# Patient Record
Sex: FEMALE | Race: WHITE
[De-identification: names, ages, dates, MRNs, and addresses within clinical notes are randomized per-mention and may not be internally consistent; named-entity substitution may affect disease eponyms.]

---

## 2017-11-09 ENCOUNTER — HOSPITAL ENCOUNTER (OUTPATIENT)
Dept: HOSPITAL 84 - D.OPS | Age: 57
Discharge: HOME | End: 2017-11-09
Attending: INTERNAL MEDICINE
Payer: COMMERCIAL

## 2017-11-09 VITALS — BODY MASS INDEX: 30.9 KG/M2

## 2017-11-09 VITALS — DIASTOLIC BLOOD PRESSURE: 76 MMHG | SYSTOLIC BLOOD PRESSURE: 134 MMHG

## 2017-11-09 DIAGNOSIS — K29.70: ICD-10-CM

## 2017-11-09 DIAGNOSIS — I85.00: Primary | ICD-10-CM

## 2017-11-09 DIAGNOSIS — Z01.812: ICD-10-CM

## 2017-11-09 DIAGNOSIS — E03.9: ICD-10-CM

## 2017-11-09 DIAGNOSIS — K21.9: ICD-10-CM

## 2017-11-09 DIAGNOSIS — K44.9: ICD-10-CM

## 2017-11-09 DIAGNOSIS — I10: ICD-10-CM

## 2017-11-09 LAB
ERYTHROCYTE [DISTWIDTH] IN BLOOD BY AUTOMATED COUNT: 13.9 % (ref 11.5–14.5)
HCT VFR BLD CALC: 43.5 % (ref 36–48)
HGB BLD-MCNC: 15 G/DL (ref 12–16)
MCH RBC QN AUTO: 32.8 PG (ref 26–34)
MCHC RBC AUTO-ENTMCNC: 34.5 G/DL (ref 31–37)
MCV RBC: 95 FL (ref 80–100)
PLATELET # BLD: 160 10X3/UL (ref 130–400)
PMV BLD AUTO: 9.3 FL (ref 7.4–10.4)
RBC # BLD AUTO: 4.58 10X6/UL (ref 4–5.4)
WBC # BLD AUTO: 7.1 10X3/UL (ref 4.8–10.8)

## 2017-11-10 NOTE — OP
PATIENT NAME:  LARISSA LOPEZ                                 MEDICAL RECORD: [id number]
:60                                             LOCATION:[facility]          
                                                         ADMISSION DATE:        
SURGEON:  JOHANN WORLEY MD          
 
 
DATE OF OPERATION:  2017
 
PROCEDURE:  EGD with biopsy.
 
REFERRING PHYSICIAN:  Dr. Terrell Suarez.
 
INDICATIONS:  Ms. Lopez is a delightful 57-year-old woman, who presents for
followup of gastric and duodenal ulcers.  She had a profound upper GI bleeding
in 2017 requiring admission to [facility].  She had been drinking quite a bit of
alcohol as well as taking BC powders, aspirin, and been on steroids.  EGD (with
Dr. Mccord) on 2017 showed very mild distal esophagitis, one esophageal
varix at the 2 o'clock position without visible vessel, very small hiatal
hernia, small gastric diverticulum in the fundus, erosive gastritis, multiple
gastric ulcers, duodenal bulbar ulcer, and erosive duodenitis.  She has been on
Protonix 40 mg daily and has been avoiding BC powders, nonsteroidal
anti-inflammatory drugs, and alcohol.  Her CBC today shows a white blood cell
count 7.1, hemoglobin 15, MCV of 95, and platelet count 160,000.  She presents
for followup EGD to document healing of gastric ulcers.
 
PREMEDICATIONS:  Total IV anesthesia (propofol 100 mg).
 
INSTRUMENT:  Olympus video gastroscope.
 
PROCEDURE AND FINDINGS:  After receiving informed consent, Ms. Lopez's posterior
pharynx was anesthetized with Cetacaine spray, placed in left lateral decubitus
position and sedated as per anesthesia.  After achieving adequate level of
sedation, gastroscope was introduced per orally and advanced to the duodenum
without difficulty.  The esophageal mucosa was without strictures or masses. I
did not any see any esophageal varices on this exam.  The Z line was slightly
irregular.  A small sliding type hiatal hernia is present.  Gastric mucosa was
notable for moderate prepyloric and antral erythema and there were few
prepyloric erosions.  There were no antral or gastric ulcers noted.  There were
also streaks of erythema noted in the body of the stomach.  Pylorus was patent
and competent.  Antral biopsies were obtained to rule out Helicobacter pylori. 
There is no evidence of residual duodenal ulcers and the duodenal mucosa
appeared normal through the second portion.  Gastroscope was then withdrawn. 
Ms. Lopez dams tolerated the procedure well, no immediate complications.
 
ASSESSMENT:
1.  Small sliding type hiatal hernia.
2.  Moderate gastritis with a few antral erosions.
3.  Gastric ulcers had healed.
4.  Small gastric diverticulum.
 
RECOMMENDATIONS:
1.  Follow up histopathology.
2.  Continue Protonix 40 mg daily.
3.  Avoid nonsteroidal anti-inflammatory drugs.
4.  Follow up in GI clinic for liver evaluation in an individual with previous
history of esophageal varices.
 
TRANSINT:[id number] Voice Confirmation ID: [id number] DOCUMENT ID: [id number]
 
 
 
OPERATIVE REPORT                               [id number]    LARISSA LOPEZ, JOHANN ROWLEY          
 
 
 
Electronically Signed by JOHANN WORLEY on 11/10/17 at 1735
 
 
 
 
 
 
 
 
 
 
 
 
 
 
 
 
 
 
 
 
 
 
 
 
 
 
 
 
 
 
 
 
 
 
 
 
 
 
 
 
 
CC: TERRELL SUAREZ MD                                             [id number]
DICTATION DATE: 17 1352     :     17 1425      [facility] 
                                                                      [id number] [place] [address]

## 2018-11-08 ENCOUNTER — HOSPITAL ENCOUNTER (OUTPATIENT)
Dept: HOSPITAL 84 - D.OPS | Age: 58
Discharge: HOME | End: 2018-11-08
Attending: INTERNAL MEDICINE
Payer: COMMERCIAL

## 2018-11-08 VITALS — BODY MASS INDEX: 28.23 KG/M2 | HEIGHT: 64 IN | WEIGHT: 165.35 LBS | BODY MASS INDEX: 28.23 KG/M2

## 2018-11-08 VITALS — DIASTOLIC BLOOD PRESSURE: 75 MMHG | SYSTOLIC BLOOD PRESSURE: 136 MMHG

## 2018-11-08 DIAGNOSIS — K29.50: ICD-10-CM

## 2018-11-08 DIAGNOSIS — Z01.812: ICD-10-CM

## 2018-11-08 DIAGNOSIS — Z79.1: ICD-10-CM

## 2018-11-08 DIAGNOSIS — K25.9: Primary | ICD-10-CM

## 2018-11-08 DIAGNOSIS — K44.9: ICD-10-CM

## 2018-11-08 DIAGNOSIS — Z87.898: ICD-10-CM

## 2018-11-08 LAB
BASOPHILS NFR BLD AUTO: 0.2 % (ref 0–2)
EOSINOPHIL NFR BLD: 2 % (ref 0–7)
ERYTHROCYTE [DISTWIDTH] IN BLOOD BY AUTOMATED COUNT: 13 % (ref 11.5–14.5)
HCT VFR BLD CALC: 40 % (ref 36–48)
HGB BLD-MCNC: 13.4 G/DL (ref 12–16)
IMM GRANULOCYTES NFR BLD: 0.2 % (ref 0–5)
LYMPHOCYTES NFR BLD AUTO: 23.4 % (ref 15–50)
MCH RBC QN AUTO: 32.8 PG (ref 26–34)
MCHC RBC AUTO-ENTMCNC: 33.5 G/DL (ref 31–37)
MCV RBC: 97.8 FL (ref 80–100)
MONOCYTES NFR BLD: 6.2 % (ref 2–11)
NEUTROPHILS NFR BLD AUTO: 68 % (ref 40–80)
PLATELET # BLD: 166 10X3/UL (ref 130–400)
PMV BLD AUTO: 9.1 FL (ref 7.4–10.4)
RBC # BLD AUTO: 4.09 10X6/UL (ref 4–5.4)
WBC # BLD AUTO: 8.7 10X3/UL (ref 4.8–10.8)

## 2019-04-12 ENCOUNTER — HOSPITAL ENCOUNTER (OUTPATIENT)
Dept: HOSPITAL 84 - D.OPS | Age: 59
Discharge: HOME | End: 2019-04-12
Attending: INTERNAL MEDICINE
Payer: COMMERCIAL

## 2019-04-12 VITALS — HEIGHT: 64 IN | WEIGHT: 164.34 LBS | BODY MASS INDEX: 28.06 KG/M2 | BODY MASS INDEX: 28.06 KG/M2

## 2019-04-12 DIAGNOSIS — Z01.812: ICD-10-CM

## 2019-04-12 DIAGNOSIS — K44.9: ICD-10-CM

## 2019-04-12 DIAGNOSIS — Z79.1: ICD-10-CM

## 2019-04-12 DIAGNOSIS — K25.7: Primary | ICD-10-CM

## 2019-04-12 LAB
ERYTHROCYTE [DISTWIDTH] IN BLOOD BY AUTOMATED COUNT: 16.5 % (ref 11.5–14.5)
HCT VFR BLD CALC: 37.2 % (ref 36–48)
HGB BLD-MCNC: 12.5 G/DL (ref 12–16)
LYMPHOCYTES NFR BLD AUTO: 26.1 % (ref 15–50)
MCH RBC QN AUTO: 29.5 PG (ref 26–34)
MCHC RBC AUTO-ENTMCNC: 33.6 G/DL (ref 31–37)
MCV RBC: 87.7 FL (ref 80–100)
NEUTROPHILS NFR BLD AUTO: 68.9 % (ref 40–80)
PLATELET # BLD: 260 10X3/UL (ref 130–400)
PMV BLD AUTO: 9.4 FL (ref 7.4–10.4)
RBC # BLD AUTO: 4.24 10X6/UL (ref 4–5.4)
WBC # BLD AUTO: 9.2 10X3/UL (ref 4.8–10.8)

## 2019-04-16 NOTE — OP
PATIENT NAME:  LARISSA LOPEZ                                 MEDICAL RECORD: [id number]
:60                                             LOCATION:[facility]          
                                                         ADMISSION DATE:        
SURGEON:  JOHANN WORLEY MD          
 
 
DATE OF OPERATION:  2019
 
PROCEDURE:  EGD with biopsy.
 
REFERRING PHYSICIAN:  Dr. Tiff Aguirre
 
INDICATIONS:  Ms. Lopez is a delightful 58-year-old woman with a history of
gastric and duodenal ulcers.  She has been taking nonsteroidal anti-inflammatory
drugs and had had an episode of hematemesis and melenic stool, underwent an EGD
on 2018 with findings showing a cratered ulcer in the prepyloric area,
gastritis, and a small hiatal hernia.  Antral biopsies were negative for
Helicobacter pylori.  She was on b.i.d. dosing of Protonix 40 mg for a month and
then daily and she continues on daily Protonix 40 mg. She presents for followup
EGD.  She has had no further episodes of hematemesis or melena and today, her
hemoglobin is 12.5 with an MCV of 87.7.  She presents for outpatient followup
EGD.
 
PREMEDICATIONS:  Total IV anesthesia (propofol 160 mg).
 
INSTRUMENT:  Olympus video gastroscope, [id number].
 
PROCEDURE AND FINDINGS:  After receiving informed consent, Ms. Lopez's posterior
pharynx was anesthetized using Cetacaine spray.  She was placed in left lateral
decubitus position and sedated as per anesthesia.  After achieving an adequate
level of sedation, gastroscope was introduced per orally and advanced in
duodenum without difficulty.  Esophageal mucosa was without erythema, ulcers,
strictures, or masses.  No varices were observed.  Small hiatal hernia was
present.  Gastric mucosa was notable for moderate prepyloric and antral erythema
with heaped up folds.  Under one of the heaped up folds, there was a persistent
ulcer base more shallow superficial and less cratered than on previous EGD in
2018.  Biopsies were taken adjacent to the ulcer and in the antrum to
rule out Helicobacter pylori.  No lesions were seen along the incisura, cardia,
fundus or in the body of the stomach.  Pylorus was patent and competent. 
Duodenal mucosa was without erythema or ulcers, appeared normal through the
second portion.  Gastroscope was then withdrawn.  Ms. Lopez tolerated the
procedure well, no immediate complications.
 
ASSESSMENT:  Persistent prepyloric back/antral ulcer, more superficial than on
less cratered and more superficial than on previous EGD, but persistent.
 
RECOMMENDATIONS:
1.  Continue to avoid anti-inflammatories.
2.  Follow up histopathology.
3.  Increase Protonix to 40 mg p.o. b.i.d.
4.  Add Sucralfate 1 gram p.o. b.i.d.
5.  Follow up EGD in 6 months.
 
TRANSINT:[id number] Voice Confirmation ID: [id number] DOCUMENT ID: [id number]
 
 
 
OPERATIVE REPORT                               [id number]    LARISSA LOPEZ TERRI MD          
 
 
 
Electronically Signed by JOHANN WORLEY on 19 at 1017
 
 
 
 
 
 
 
 
 
 
 
 
 
 
 
 
 
 
 
 
 
 
 
 
 
 
 
 
 
 
 
 
 
 
 
 
 
 
 
 
 
CC: TIFF AGUIRRE MD                                           [id number]
DICTATION DATE: 19 1212     :     19 1330      [facility] 
                                                                      [id number] [place] [address]